# Patient Record
Sex: MALE | Employment: PART TIME | URBAN - METROPOLITAN AREA
[De-identification: names, ages, dates, MRNs, and addresses within clinical notes are randomized per-mention and may not be internally consistent; named-entity substitution may affect disease eponyms.]

---

## 2019-11-07 ENCOUNTER — APPOINTMENT (OUTPATIENT)
Dept: GENERAL RADIOLOGY | Facility: CLINIC | Age: 39
End: 2019-11-07
Attending: EMERGENCY MEDICINE

## 2019-11-07 ENCOUNTER — HOSPITAL ENCOUNTER (EMERGENCY)
Facility: CLINIC | Age: 39
Discharge: HOME | End: 2019-11-07
Attending: EMERGENCY MEDICINE | Admitting: EMERGENCY MEDICINE

## 2019-11-07 ENCOUNTER — APPOINTMENT (OUTPATIENT)
Dept: ULTRASOUND IMAGING | Facility: CLINIC | Age: 39
End: 2019-11-07
Attending: EMERGENCY MEDICINE

## 2019-11-07 VITALS
DIASTOLIC BLOOD PRESSURE: 99 MMHG | HEART RATE: 99 BPM | OXYGEN SATURATION: 97 % | TEMPERATURE: 99 F | SYSTOLIC BLOOD PRESSURE: 196 MMHG | RESPIRATION RATE: 16 BRPM

## 2019-11-07 DIAGNOSIS — M71.21 BAKER CYST, RIGHT: ICD-10-CM

## 2019-11-07 PROCEDURE — 93971 EXTREMITY STUDY: CPT | Mod: RT

## 2019-11-07 PROCEDURE — 99284 EMERGENCY DEPT VISIT MOD MDM: CPT | Mod: 25 | Performed by: EMERGENCY MEDICINE

## 2019-11-07 PROCEDURE — 73560 X-RAY EXAM OF KNEE 1 OR 2: CPT | Mod: RT

## 2019-11-07 PROCEDURE — 99284 EMERGENCY DEPT VISIT MOD MDM: CPT | Mod: Z6 | Performed by: EMERGENCY MEDICINE

## 2019-11-07 NOTE — ED AVS SNAPSHOT
[facility], [place], Emergency Department  [address]  [facility] [address]  Phone:  [phone]  Fax:  [phone]                                    Nino Abbott   MRN: [id number]    Department:  [facility], Emergency Department   Date of Visit:  11/7/2019           After Visit Summary Signature Page    I have received my discharge instructions, and my questions have been answered. I have discussed any challenges I see with this plan with the nurse or doctor.    ..........................................................................................................................................  Patient/Patient Representative Signature      ..........................................................................................................................................  Patient Representative Print Name and Relationship to Patient    ..................................................               ................................................  Date                                   Time    ..........................................................................................................................................  Reviewed by Signature/Title    ...................................................              ..............................................  Date                                               Time          22EPIC Rev 08/18

## 2019-11-08 NOTE — ED NOTES
"Pt c/o of pain behind Right knee x 5 days, difficult to ambulate, Full weightbearing tolerated.  CMS intact, tenderness noted and Pt states \"My knee is swollen\"  "

## 2019-11-08 NOTE — DISCHARGE INSTRUCTIONS
Please make an appointment to follow up with Orthopedics (phone: [phone]) as soon as possible if you have any concerns.

## 2019-11-08 NOTE — ED PROVIDER NOTES
[facility] EMERGENCY DEPARTMENT ([facility])    11/07/19        History     Chief Complaint   Patient presents with     Knee Pain     right knee pain: no recent travel: sleeps in recliner for long time     The history is provided by the patient and medical records.     Nino Abbott is a 39 year old male with no known past medical history who presents here to the Emergency Department due to right posterior knee pain. Patient reports he has been having this pain for the past x5 days. Reports he woke up to the onset of his pain. Denies falls or twists that might have aggravated or injured the knee. Denies pain in his left knee. Denies problems with his knees in the past. States his pain is exacerbated while walking or moving, however, states he has not pain while sitting or laying flat.    I have reviewed the Medications, Allergies, Past Medical and Surgical History, and Social History in the Epic system.    History reviewed. No pertinent past medical history.    No past surgical history on file.    No family history on file.    Social History     Tobacco Use     Smoking status: Not on file   Substance Use Topics     Alcohol use: Not on file       No current facility-administered medications for this encounter.      No current outpatient medications on file.      No Known Allergies      Review of Systems   Musculoskeletal:        Positive for right posterior knee pain  Negative for left knee pain   All other systems reviewed and are negative.      Physical Exam   BP: (!) 196/99  Pulse: 99  Temp: 99  F (37.2  C)  Resp: 16  SpO2: 97 %      Physical Exam  Constitutional:       General: He is not in acute distress.     Appearance: He is well-developed. He is not diaphoretic.      Comments: Comfortably resting, lying in bed, NAD, nondiaphoretic, lucid, fully conversant, no  respiratory distress, alert and oriented.     HENT:      Head: Normocephalic and atraumatic.   Eyes:      General: No scleral  icterus.  Neck:      Musculoskeletal: Normal range of motion and neck supple.   Cardiovascular:      Rate and Rhythm: Normal rate.   Pulmonary:      Effort: Pulmonary effort is normal.   Musculoskeletal:      Comments: The bilateral lower extremities were examined.  There was tenderness to the posterior right knee. No masses or trauma noted. There was no skin changes, including ecchymosis, abrasions, lacerations or lesions.  The bilateral knees and normal.  Posterior drawer test and normal valgus and varus strain.  There was no tenderness to the bilateral patella, and no joint effusion.  The patient was to extend each knee fully.     Skin:     General: Skin is warm and dry.      Capillary Refill: Capillary refill takes less than 2 seconds.      Findings: No rash.   Neurological:      Mental Status: He is alert and oriented to person, place, and time.         ED Course   6:49 PM  The patient was seen and examined by Rex Martinez MD in Room HW03.        Procedures             Critical Care time:  none   knee xray: IMPRESSION:  Unremarkable examination.       Ultrasound: IMPRESSION: No evidence for DVT within the right leg. There is an  anterior superior right knee fluid collection.        Labs Ordered and Resulted from Time of ED Arrival Up to the Time of Departure from the ED - No data to display         Assessments & Plan (with Medical Decision Making)   This is a 39-year-old male coming into the emergency room with right knee discomfort.  Patient had no trauma and noticed that the discomfort came on in the morning after he woke up.  He is able to fully range the knee and ambulate but has discomfort when he does bear weight.  On physical exam he has some mild tenderness behind the knee but the rest of the exam is completely unremarkable.  X-rays are read as unremarkable.  Ultrasound is suspicious for Baker's cyst.  At this time I will discharge him with a instruction to follow-up with sports medicine and aftercare  "instructions.    Pt was discharged home/self-care.  PT was provided written discharge instructions. Additionally verbal instructions were given and discussed with patient.  PT was asked to return to the ED immediately for any new or concerning symptoms.  Pt was in agreement, endorsed understanding, and questions were answered.         I have reviewed the nursing notes.    I have reviewed the findings, diagnosis, plan and need for follow up with the patient.    There are no discharge medications for this patient.      Final diagnoses:   Baker cyst, right     ISaroj, am serving as a trained medical scribe to document services personally performed by Rex Martinez MD, based on the provider's statements to me.   IRex MD, was physically present and have reviewed and verified the accuracy of this note documented by Saroj Sanchez.    \"This dictation was performed with the assistance of voice recognition software and may contain inadvertant transcription  errors,  omissions and/or  inadvertent word substitution.\" --Rex Martinez MD     11/7/2019   [facility] EMERGENCY DEPARTMENT     Rex Martinez MD  11/07/19 2234    "